# Patient Record
Sex: MALE | Race: BLACK OR AFRICAN AMERICAN | Employment: FULL TIME | URBAN - METROPOLITAN AREA
[De-identification: names, ages, dates, MRNs, and addresses within clinical notes are randomized per-mention and may not be internally consistent; named-entity substitution may affect disease eponyms.]

---

## 2019-04-28 ENCOUNTER — HOSPITAL ENCOUNTER (EMERGENCY)
Facility: CLINIC | Age: 24
Discharge: HOME | End: 2019-04-28
Attending: EMERGENCY MEDICINE | Admitting: EMERGENCY MEDICINE
Payer: COMMERCIAL

## 2019-04-28 ENCOUNTER — APPOINTMENT (OUTPATIENT)
Dept: GENERAL RADIOLOGY | Facility: CLINIC | Age: 24
End: 2019-04-28
Attending: EMERGENCY MEDICINE
Payer: COMMERCIAL

## 2019-04-28 VITALS
WEIGHT: 190 LBS | RESPIRATION RATE: 16 BRPM | TEMPERATURE: 98.4 F | HEART RATE: 62 BPM | DIASTOLIC BLOOD PRESSURE: 72 MMHG | SYSTOLIC BLOOD PRESSURE: 151 MMHG | OXYGEN SATURATION: 100 %
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
## 2019-04-28 DIAGNOSIS — S83.91XA SPRAIN OF RIGHT KNEE, UNSPECIFIED LIGAMENT, INITIAL ENCOUNTER: ICD-10-CM

## 2019-04-28 DIAGNOSIS — M25.561 ACUTE PAIN OF RIGHT KNEE: ICD-10-CM

## 2019-04-28 PROCEDURE — 99284 EMERGENCY DEPT VISIT MOD MDM: CPT

## 2019-04-28 PROCEDURE — 29505 APPLICATION LONG LEG SPLINT: CPT | Mod: RT

## 2019-04-28 PROCEDURE — 73562 X-RAY EXAM OF KNEE 3: CPT | Mod: RT

## 2019-04-28 PROCEDURE — 25000132 ZZH RX MED GY IP 250 OP 250 PS 637: Performed by: EMERGENCY MEDICINE

## 2019-04-28 RX ORDER — ACETAMINOPHEN 325 MG/1
650 TABLET ORAL ONCE
Status: COMPLETED | OUTPATIENT
Start: 2019-04-28 | End: 2019-04-28

## 2019-04-28 RX ORDER — IBUPROFEN 600 MG/1
600 TABLET, FILM COATED ORAL ONCE
Status: COMPLETED | OUTPATIENT
Start: 2019-04-28 | End: 2019-04-28

## 2019-04-28 RX ORDER — IBUPROFEN 600 MG/1
600 TABLET, FILM COATED ORAL EVERY 6 HOURS PRN
Qty: 40 TABLET | Refills: 0 | Status: SHIPPED | OUTPATIENT
Start: 2019-04-28 | End: 2019-05-08

## 2019-04-28 RX ADMIN — IBUPROFEN 600 MG: 600 TABLET ORAL at 05:55

## 2019-04-28 RX ADMIN — ACETAMINOPHEN 650 MG: 325 TABLET, FILM COATED ORAL at 05:55

## 2019-04-28 ASSESSMENT — ENCOUNTER SYMPTOMS
ARTHRALGIAS: 1
HEADACHES: 1

## 2019-04-28 NOTE — ED PROVIDER NOTES
History     Chief Complaint:  Right Knee Pain     The history is provided by the patient.      Kanu Hope is a 23 year old male who presents with non-radiating right knee pain. Last night he was playing basketball and hyperextended this knee. He did not have pain right away but after about 2 hours developed pain medially which has progressed making it difficult to bear weight. Currently, he also complains of a headache however he denies additional injuries. He has had no analgesics prior to arrival.     Allergies:  No Known Drug Allergies    Medications:    Medications reviewed. No current medications.     Past Medical History:    Medical history reviewed. No pertinent medical history.    Past Surgical History:    Surgical history reviewed. No pertinent surgical history.    Family History:    Family history reviewed. No pertinent family history.     Social History:  The patient presented alone.   [place]  Enjoys basketball.    Review of Systems   Musculoskeletal: Positive for arthralgias (R knee).   Neurological: Positive for headaches.   All other systems reviewed and are negative.    Physical Exam     Patient Vitals for the past 24 hrs:   BP Temp Pulse Resp SpO2 Weight   04/28/19 0443 151/72 98.4  F (36.9  C) 62 16 100 % 86.2 kg (190 lb)     Physical Exam  General: WD/WN; well appearing young  man; cooperative  Head:  Atraumatic  Eyes:  Conjunctivae, lids, and sclerae are normal  ENT:    Normal nose; MMM  Neck:  Supple; normal ROM  CV:  Right DP 2+  Resp:  No respiratory distress  GI:  Nondistended    MS:  Normal ROM including no significant limited range of the right knee; mild tenderness palpation over the medial aspect of the right knee without significant tenderness along the medial joint line and no large effusion; no significant bony tenderness; compartments soft throughout; no skin changes  Skin:  Warm; non-diaphoretic; no pallor  Neuro: Awake; A&Ox3; 5 out of 5  strength of plantarflexion and dorsiflexion; sensation intact to light touch  Psych:  Normal mood and affect; normal speech  Vitals reviewed.    Emergency Department Course     Imaging:  XR Knee Right 3 Views  No acute osseous abnormality.  JEFRY MADRIGAL MD  Reading per radiology    Interventions:  0555: Tylenol 650 mg PO  0555: ibuprofen 600 mg PO     Emergency Department Course:  0501 Nursing notes and vitals reviewed.  0519 I performed an exam of the patient as documented above.   0545 The patient was sent for a x-ray while in the emergency department, results above.   0606 Patient rechecked and updated. I personally reviewed the imaging results with the patient and answered of his questions prior to discharge, anticipatory guidance given.    Impression & Plan      Medical Decision Making:  Kanu is a 23 year old man who reports he hyperextended his knee while playing basketball last night.  Initially did not have significant pain though it has worsened since that time such that he has a difficult time weightbearing.  He denies all other injuries or complaints and appears well on exam.  He has mild tenderness over the medial aspect of the knee without tenderness along the medial joint line.  He has no significant limitation to his range of motion and no large effusion.  He has no significant bony tenderness or skin changes and compartments are soft.  He has excellent distal sensation and pulses with normal strength.  I did obtain a knee x-ray given his difficulty weightbearing.  However, fortunately, there is no evidence of acute fracture or malalignment.  As such, his symptoms are most likely related to sprain or strain alone - possibly of the MCL given location of pain - and there is no indication for further emergency department work-up.  He was given Tylenol and ibuprofen and the knee was iced while under my care and he reports mild improvement in symptoms.  I have discussed RICE therapy and patient was  placed in an immobilizer with plan for weightbearing as tolerated.  We discussed restrictions at work and need to follow-up with orthopedics if he is not having improvement after a few days.  I recommend patient continue Tylenol and ibuprofen as needed for pain and returning to the ER with new concerns.  Patient verbalized understanding of treatment plan, follow-up, and return precautions and is amenable to discharge.    Diagnosis:    ICD-10-CM   1. Sprain of right knee, unspecified ligament, initial encounter S83.91XA   2. Injury while playing basketball Y93.67   3. Acute pain of right knee M25.561     Disposition: Discharged home in stable condition.    Discharge Medications:     ibuprofen 600 MG tablet  Commonly known as:  ADVIL/MOTRIN      Dose:  600 mg  Take 1 tablet (600 mg) by mouth every 6 hours as needed for moderate pain  Quantity:  40 tablet  Refills:  0       Scribe Disclosure:  Joe ELENA, am serving as a scribe at 5:46 AM on 4/28/2019 to document services personally performed by Eladia Mcgregor MD based on my observations and the provider's statements to me.    [facility] EMERGENCY DEPARTMENT       Eladia Mcgregor MD  04/30/19 2891

## 2019-04-28 NOTE — ED TRIAGE NOTES
Pt in with C/O R knee pain. Pt reports he hyper extended his knee yesterday while playing basketball. No deformity noted on exam. +CSM. Pt walks into triage with a limp using one crutch. Pt A&Ox4 ABCD's intact

## 2019-04-28 NOTE — LETTER
May 1, 2019      To Whom It May Concern:      Kanu Hope was seen in our Emergency Department today, 04/28/20149.  He has an appointment with [facility] 5/2 and needs to be off work until cleared by them.     Sincerely,        Pedro Calixto RN

## 2019-04-28 NOTE — DISCHARGE INSTRUCTIONS
Rest and continue icing.  Use immobilizer for at least 3 days.  You can continue with crutches and weight-bearing as your pain allows.  If you are having persistent difficulty walking due to pain after 3 days, follow-up with orthopedics.  Otherwise, you can start walking when pain allows and take the immobilizer off after 3 days.  Use ibuprofen or Tylenol as needed for pain.  Elevate as you are able.  Return with new or concerning symptoms of any kind.

## 2019-04-28 NOTE — LETTER
April 28, 2019      To Whom It May Concern:      Kanu Hope was seen in our Emergency Department today, 04/28/19. He must be in the knee immobilizer for 3 days with weight bearing as tolerated. Please accommodate these restrictions or excuse his absence from work.      Sincerely,        Eladia Mcgregor MD

## 2019-04-28 NOTE — ED AVS SNAPSHOT
[facility] Emergency Department  [address]  [facility] [address]  Phone:  [phone]  Fax:  [phone]                                    Kanu Hope   MRN: [id number]    Department:  [facility] Emergency Department   Date of Visit:  4/28/2019           After Visit Summary Signature Page    I have received my discharge instructions, and my questions have been answered. I have discussed any challenges I see with this plan with the nurse or doctor.    ..........................................................................................................................................  Patient/Patient Representative Signature      ..........................................................................................................................................  Patient Representative Print Name and Relationship to Patient    ..................................................               ................................................  Date                                   Time    ..........................................................................................................................................  Reviewed by Signature/Title    ...................................................              ..............................................  Date                                               Time          22EPIC Rev 08/18